# Patient Record
Sex: MALE | Race: WHITE
[De-identification: names, ages, dates, MRNs, and addresses within clinical notes are randomized per-mention and may not be internally consistent; named-entity substitution may affect disease eponyms.]

---

## 2017-01-04 ENCOUNTER — HOSPITAL ENCOUNTER (OUTPATIENT)
Dept: HOSPITAL 45 - C.CTS | Age: 62
Discharge: HOME | End: 2017-01-04
Attending: UROLOGY
Payer: COMMERCIAL

## 2017-01-04 DIAGNOSIS — C64.9: Primary | ICD-10-CM

## 2017-01-04 NOTE — DIAGNOSTIC IMAGING REPORT
CT OF THE ABDOMEN AND PELVIS WITH AND WITHOUT CONTRAST RENAL PROTOCOL



CLINICAL HISTORY: Renal cell carcinoma.    



COMPARISON STUDY:  CT of the abdomen and pelvis May 23, 2016.



TECHNIQUE: Unenhanced, nephrographic and delayed phase imaging was performed.

Injection of 116 cc of Optiray 320 IV was uneventful.



FINDINGS: The chest will be reported separately. The liver, spleen, adrenal

glands and pancreas are unremarkable. Postsurgical findings within the midpole

of the right kidney suggestive of a partial nephrectomy are noted. Nonenhancing

material within the operative bed has slightly diminished since prior exam. No

enhancement is identified to suggest residual or recurrent tumor at this site.

There are numerous bilateral renal lesions. These likely reflect cysts. A few

lesions are too small to characterize. A 1.6 cm lesion within the midpole of the

left kidney demonstrates no significant enhancement and is therefore benign.

There is no hydronephrosis. No abdominal or pelvic lymphadenopathy is present.

There are no suspicious skeletal lesions. Caliber and wall thickness of small

and large bowel are normal.



IMPRESSION:  



1. No evidence of residual or recurrent malignancy status post partial right

nephrectomy.



2. Multiple bilateral renal lesions. These likely reflect cysts although a few

subcentimeter lesions are too small to characterize. 







Electronically signed by:  Danny Thrasher M.D.

1/4/2017 3:38 PM

## 2017-01-04 NOTE — DIAGNOSTIC IMAGING REPORT
CT OF THE CHEST WITH IV CONTRAST



CLINICAL HISTORY: Renal cell carcinoma    



COMPARISON STUDY:  5/23/2016 



TECHNIQUE:  Following the IV administration of 116 mL of Optiray-320, CT of the

thorax was performed from the thoracic inlet to the lung bases. Images are

reviewed in the axial, sagittal, and coronal planes. IV contrast was

administered without complication.



CT DOSE: 1368.68 mGy.cm



FINDINGS:



Thyroid: Imaged portions of the thyroid gland are normal in appearance.



Thoracic aorta: The thoracic aorta is normal in course and caliber, noting

standard 3-vessel arch anatomy. No aneurysm or dissection is seen.



Pulmonary vasculature: The pulmonary trunk is normal in caliber. There are no

central filling defects identified to suggest pulmonary embolus. Note that this

examination was not protocoled for the evaluation of pulmonary emboli.



HEART: The heart is normal in size and configuration, without pericardial

effusion.



Lungs and pleural spaces: There are postsurgical changes within the right lower

lobe. There are mild dependent atelectatic changes present. There is a 3 mm

right middle lobe pulmonary nodule as visualized in image #149/326. This remain

stable. There is a 3 mm right upper lobe pulmonary nodule as visualized on image

#1 6/326. This remain stable. There is a 3 mm pulmonary nodule within the

lingula as visualized in image #184/326. This remain stable. There is a new

pancake-shaped 10 mm nodule within the lingula as visualized in image #217/326.

Close follow-up is recommended to differentiate a neoplastic nodule from focal

atelectasis.



Mediastinum: There is no mediastinal lymphadenopathy.



Devora: There is no evidence of pathologic hilar lymphadenopathy



Axilla: Clear.



Upper abdomen:  There is a complex partially cystic 21 mm right renal opacity.

This likely relates to the patient's previously resected neoplasm. This will be

discussed on the dedicated CT scan of the abdomen and pelvis



Skeletal structures: There are no lytic or blastic osseous lesions.



IMPRESSION:  

1. No evidence of pathologic adenopathy

2. Postsurgical changes in the right lower lobe

3. Multiple bilateral subcentimeter pulmonary nodules. These remain stable with

the exception of a new pancake shaped 10 mm nodule within the base of the

lingula. Close follow-up is recommended to differentiate a neoplastic nodule

from focal atelectasis







Electronically signed by:  Adolfo Gonzales M.D.

1/4/2017 10:59 AM

## 2017-04-04 ENCOUNTER — HOSPITAL ENCOUNTER (OUTPATIENT)
Dept: HOSPITAL 45 - C.CTS | Age: 62
Discharge: HOME | End: 2017-04-04
Attending: NURSE PRACTITIONER
Payer: COMMERCIAL

## 2017-04-04 DIAGNOSIS — C64.1: Primary | ICD-10-CM

## 2017-04-04 NOTE — DIAGNOSTIC IMAGING REPORT
CHEST CT WITH CONTRAST



CT DOSE: 305.31 mGy.cm



HISTORY:      Renal cell cancer. Follow-up pulmonary nodules.



TECHNIQUE: Multiaxial CT images of the chest were performed following the

intravenous administration of contrast.



COMPARISON:  Chest CT 1/4/2017.



FINDINGS:  The central airways are patent. No pleural effusions. No

pneumothorax. Evidence for prior wedge resection within the right lower lobe. A

few small linear densities adjacent to the right resection favor scarring. This

is not significantly changed. Mild emphysema. Stable 3 mm nodule within the

lingula on image 189. Stable 3 mm left lower lobe nodule on image 227 and stable

3 mm left lower lobe nodule on image 194. Stable 3 mm nodule within the right

upper lobe on image 110 and within the right middle lobe on image 152. Stable 3

mm subpleural nodule within the right middle lobe on image 184. A 10 mm nodule

within the lingula on the prior study has almost completely resolved and is

consistent with resolving atelectasis. No new pulmonary nodules. No suspicious

lytic or blastic osseous lesions. Old left postthoracotomy changes. No

mediastinal or hilar lymphadenopathy. No hepatic or splenic masses. Normal

adrenal glands. Partially visualized right renal abnormality which appears to be

stable. This may be due to postoperative change. This is incompletely evaluated

on this study.



IMPRESSION: 



1. The 10 mm lingular nodule seen on the prior study has essentially resolved.

Therefore, this favors resolving atelectasis.

2. A few scattered subcentimeter indeterminate pulmonary nodules remain

unchanged. Continued follow is recommended.

3. No new pulmonary nodules identified.

4. Postoperative as described above. 







Electronically signed by:  Zhou Velásquez M.D.

4/4/2017 8:41 AM



Dictated Date/Time:  4/4/2017 8:32 AM

## 2017-09-20 ENCOUNTER — HOSPITAL ENCOUNTER (OUTPATIENT)
Dept: HOSPITAL 45 - C.CTS | Age: 62
Discharge: HOME | End: 2017-09-20
Attending: INTERNAL MEDICINE
Payer: COMMERCIAL

## 2017-09-20 DIAGNOSIS — C64.1: Primary | ICD-10-CM

## 2017-09-20 LAB
ALBUMIN/GLOB SERPL: 1.2 {RATIO} (ref 0.9–2)
ALP SERPL-CCNC: 49 U/L (ref 45–117)
ALT SERPL-CCNC: 28 U/L (ref 12–78)
ANION GAP SERPL CALC-SCNC: 5 MMOL/L (ref 3–11)
AST SERPL-CCNC: 16 U/L (ref 15–37)
BUN SERPL-MCNC: 21 MG/DL (ref 7–18)
BUN/CREAT SERPL: 17.5 (ref 10–20)
CALCIUM SERPL-MCNC: 9.6 MG/DL (ref 8.5–10.1)
CHLORIDE SERPL-SCNC: 104 MMOL/L (ref 98–107)
CHOLEST/HDLC SERPL: 3.6 {RATIO}
CO2 SERPL-SCNC: 31 MMOL/L (ref 21–32)
CREAT SERPL-MCNC: 1.2 MG/DL (ref 0.6–1.4)
GLOBULIN SER-MCNC: 3.4 GM/DL (ref 2.5–4)
GLUCOSE SERPL-MCNC: 100 MG/DL (ref 70–99)
GLUCOSE UR QL: 49 MG/DL
KETONES UR QL STRIP: 104 MG/DL
NITRITE UR QL STRIP: 104 MG/DL (ref 0–150)
PH UR: 174 MG/DL (ref 0–200)
POTASSIUM SERPL-SCNC: 3.6 MMOL/L (ref 3.5–5.1)
SODIUM SERPL-SCNC: 140 MMOL/L (ref 136–145)
VERY LOW DENSITY LIPOPROT CALC: 21 MG/DL

## 2017-09-20 NOTE — DIAGNOSTIC IMAGING REPORT
(CHEST) THORAX WITH



CLINICAL HISTORY: 62 years-old Male presenting with RENAL CELL CANCER, partial

right nephrectomy. 



TECHNIQUE: Multidetector CT imaging of the chest was performed after the

administration of intravenous contrast. IV contrast: 95 mL of Optiray 320. A

dose lowering technique was used consistent with the principles of ALARA (as low

as reasonably achievable).



COMPARISON: 4/4/2017.



CT DOSE (mGy.cm): The estimated cumulative dose is 1185.72 mGycm.



FINDINGS:



 topogram: Unremarkable.



On soft tissue windows, normal thyroid and thoracic inlet. Few small hilar lymph

nodes, likely reactive. No pathologically enlarged lymph nodes. Mild

atherosclerosis of aortic arch. Normal heart size. No pericardial or pleural

effusion. 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 



On lung windows, postsurgical changes of wedge resection in the superior segment

of the right lower lobe, unchanged. Minimal bandlike opacities at the lung bases

likely atelectasis or scarring. No new pulmonary nodule. Airways patent.

Multiple previously noted nodules index below:

-Stable solid 3 mm lingular nodule (series 4 image 181).  

-Stable solid 3 mm left lower lobe nodule (series 4 image 209).

-Stable solid 3 mm left lower lobe nodule (series 4 image 186). 

-Stable solid 3 mm right upper lobe nodule (series 4 image 104).

-Stable solid triangular 3 mm right middle lobe nodule (series 4 image 147).  

-Stable solid 3 mm subpleural right middle lobe nodule (series 4 image 181). 



On bone windows, degenerative changes of the spine.



IMPRESSION:

1.  Stable bilateral solid pulmonary nodules measuring up to 3 mm. These have

been stable since 5/23/2016. No further follow-up is warranted per [employer]

[employer]ociety 2017 recommendations below.



2.  No new pulmonary nodule. No acute intrathoracic pathology.



Please refer to below summary of Fleischner Society 2017 recommendations for

follow-up of incidental CT nodules (H Minor et al. Guidelines for management

of incidental pulmonary nodules detected on CT images: From the Fleischner

Society 2017. Radiology 2017; 284: 228-243.)



SOLID NODULES



Single nodule; size < 6 mm

*  Low risk patients: No routine follow-up

*  High risk patients: Optional CT at 12 months



Single nodule; size 6-8 mm

*  Low risk patients: CT at 6-12 months, then consider CT at 18-24 months

*  High risk patients: CT at 6-12 months, then at 18-24 months



Single nodule; size > 8 mm

*  Either low or high risk patients: Considered CT at 3 months, PET/CT, or

tissue sampling



Multiple nodules; size < 6 mm

*  Low risk patients: No routine follow up

*  High risk patients: Optional CT at 12 months



Multiple nodules; size 6-8 mm

*  Low risk patients: CT at 3-6 months, then consider CT at 18-24 months

*  High risk patients: CT at 3-6 months, then at 18-24 months



Multiple nodules; size > 8 mm

*  Low risk patients: CT at 3-6 months, then consider at 18-24 months

*  High risk patients: CT at 3-6 months, then at 18-24 months





Note: These guidelines apply to incidental nodules. These guidelines do not

apply to patients younger than 35 years, immunocompromised patients, or patients

with cancer.

*  Low risk patients: Minimal or absent history of smoking and/or other known

risk factors

*  High risk patients: History of smoking, exposure to other carcinogens,

emphysema, fibrosis, upper lobe location, family history of lung cancer, etc. 

*  If a nodule up to 8 mm is partly solid or is ground glass, further follow-up

is required after 24 months to exclude possible slow growing adenocarcinoma.





SUBSOLID NODULES



Single ground-glass nodule

*  Nodule size < 6 mm: No routine follow-up

*  Nodule size > or = 6 mm: CT at 6-12 months to confirm persistence, then CT

every 2 years until 5 years



Single part-solid nodule

*  Nodule size < 6 mm: No routine follow-up

*  Nodules size > or = 6 mm: CT at 3-6 months to confirm persistence. If

unchanged and solid component remains < 6 mm, annual CT should be performed for

5 years



Multiple nodules

*  Nodule size < 6 mm: CT at 3-6 months. If stable, consider CT at 2 and 4

years.

*  Nodules size > or = 6 mm: CT at 3-6 months. Subsequent management based on

the most suspicious nodule(s)







       







Electronically signed by:  Yeyo Medina M.D.

9/20/2017 12:11 PM



Dictated Date/Time:  9/20/2017 12:01 PM

## 2017-09-20 NOTE — DIAGNOSTIC IMAGING REPORT
ABD/PELVIS IV AND ORAL CONT



CT DOSE:    



HISTORY: Renal cell carcinoma  RENAL CELL CANCER



TECHNIQUE: Multiaxial CT images of the abdomen and pelvis were performed

following the use of intravenous and oral contrast.  A dose lowering technique

was utilized adhering to the principles of ALARA.





COMPARISON STUDY: 1/4/2017



FINDINGS: The lung bases are clear. The liver, spleen, gallbladder, pancreas,

kidneys, and adrenal glands are within normal limits. No bowel wall thickening

or obstruction. The pelvic organs are unremarkable. No suspicious lytic or

blastic osseous lesions. Stable postoperative changes mid pole right kidney

laterally. Stable subcentimeter nodular densities bilaterally statistically

suggestive of small cysts. These again are unchanged.



No significant abdominal pelvic or inguinal adenopathy. Bowel pattern overall is

nonobstructive. Bladder is midline. Inguinal regions are unremarkable.



IMPRESSION: 



1. Stable postprocedural change right kidney.





2. Stable subcentimeter cysts throughout both kidneys.

3. Otherwise negative abdomen and pelvis.

4. No change from the prior exam. 









The above report was generated using voice recognition software.  It may contain

grammatical, syntax or spelling errors.







Electronically signed by:  Josemanuel Stewart M.D.

9/20/2017 12:13 PM



Dictated Date/Time:  9/20/2017 12:08 PM

## 2017-10-11 ENCOUNTER — HOSPITAL ENCOUNTER (OUTPATIENT)
Dept: HOSPITAL 45 - C.LABMFLN | Age: 62
Discharge: HOME | End: 2017-10-11
Attending: INTERNAL MEDICINE
Payer: COMMERCIAL

## 2017-10-11 DIAGNOSIS — C64.1: Primary | ICD-10-CM

## 2017-10-11 LAB
ALBUMIN/GLOB SERPL: 1.1 {RATIO} (ref 0.9–2)
ALP SERPL-CCNC: 49 U/L (ref 45–117)
ALT SERPL-CCNC: 30 U/L (ref 12–78)
ANION GAP SERPL CALC-SCNC: 8 MMOL/L (ref 3–11)
AST SERPL-CCNC: 15 U/L (ref 15–37)
BASOPHILS # BLD: 0.02 K/UL (ref 0–0.2)
BASOPHILS NFR BLD: 0.4 %
BUN SERPL-MCNC: 17 MG/DL (ref 7–18)
BUN/CREAT SERPL: 13.4 (ref 10–20)
CALCIUM SERPL-MCNC: 8.8 MG/DL (ref 8.5–10.1)
CHLORIDE SERPL-SCNC: 106 MMOL/L (ref 98–107)
CO2 SERPL-SCNC: 26 MMOL/L (ref 21–32)
COMPLETE: YES
CREAT SERPL-MCNC: 1.3 MG/DL (ref 0.6–1.4)
EOSINOPHIL NFR BLD AUTO: 229 K/UL (ref 130–400)
GLOBULIN SER-MCNC: 3.6 GM/DL (ref 2.5–4)
GLUCOSE SERPL-MCNC: 99 MG/DL (ref 70–99)
HCT VFR BLD CALC: 44.4 % (ref 42–52)
IG%: 0.2 %
IMM GRANULOCYTES NFR BLD AUTO: 31.9 %
LYMPHOCYTES # BLD: 1.72 K/UL (ref 1.2–3.4)
MCH RBC QN AUTO: 29.9 PG (ref 25–34)
MCHC RBC AUTO-ENTMCNC: 33.3 G/DL (ref 32–36)
MCV RBC AUTO: 89.7 FL (ref 80–100)
MONOCYTES NFR BLD: 9.3 %
NEUTROPHILS # BLD AUTO: 2.4 %
NEUTROPHILS NFR BLD AUTO: 55.8 %
PMV BLD AUTO: 10.6 FL (ref 7.4–10.4)
POTASSIUM SERPL-SCNC: 3.5 MMOL/L (ref 3.5–5.1)
PSA SERPL-MCNC: 0.7 NG/ML (ref 0–4)
RBC # BLD AUTO: 4.95 M/UL (ref 4.7–6.1)
SODIUM SERPL-SCNC: 140 MMOL/L (ref 136–145)
WBC # BLD AUTO: 5.4 K/UL (ref 4.8–10.8)

## 2018-01-23 ENCOUNTER — HOSPITAL ENCOUNTER (OUTPATIENT)
Dept: HOSPITAL 45 - C.LABMFLN | Age: 63
Discharge: HOME | End: 2018-01-23
Attending: INTERNAL MEDICINE
Payer: COMMERCIAL

## 2018-01-23 DIAGNOSIS — C64.1: Primary | ICD-10-CM

## 2018-01-23 LAB
ALBUMIN SERPL-MCNC: 3.9 GM/DL (ref 3.4–5)
ALP SERPL-CCNC: 49 U/L (ref 45–117)
ALT SERPL-CCNC: 42 U/L (ref 12–78)
AST SERPL-CCNC: 16 U/L (ref 15–37)
BASOPHILS # BLD: 0.02 K/UL (ref 0–0.2)
BASOPHILS NFR BLD: 0.3 %
BUN SERPL-MCNC: 23 MG/DL (ref 7–18)
CALCIUM SERPL-MCNC: 8.8 MG/DL (ref 8.5–10.1)
CO2 SERPL-SCNC: 28 MMOL/L (ref 21–32)
CREAT SERPL-MCNC: 1.21 MG/DL (ref 0.6–1.4)
EOS ABS #: 0.14 K/UL (ref 0–0.5)
EOSINOPHIL NFR BLD AUTO: 210 K/UL (ref 130–400)
GLUCOSE SERPL-MCNC: 94 MG/DL (ref 70–99)
HCT VFR BLD CALC: 43.8 % (ref 42–52)
HGB BLD-MCNC: 15 G/DL (ref 14–18)
IG#: 0.01 K/UL (ref 0–0.02)
IMM GRANULOCYTES NFR BLD AUTO: 35.9 %
LYMPHOCYTES # BLD: 2.41 K/UL (ref 1.2–3.4)
MCH RBC QN AUTO: 30.4 PG (ref 25–34)
MCHC RBC AUTO-ENTMCNC: 34.2 G/DL (ref 32–36)
MCV RBC AUTO: 88.8 FL (ref 80–100)
MONO ABS #: 0.45 K/UL (ref 0.11–0.59)
MONOCYTES NFR BLD: 6.7 %
NEUT ABS #: 3.69 K/UL (ref 1.4–6.5)
NEUTROPHILS # BLD AUTO: 2.1 %
NEUTROPHILS NFR BLD AUTO: 54.9 %
PMV BLD AUTO: 10.7 FL (ref 7.4–10.4)
POTASSIUM SERPL-SCNC: 3.8 MMOL/L (ref 3.5–5.1)
PROT SERPL-MCNC: 7.3 GM/DL (ref 6.4–8.2)
RED CELL DISTRIBUTION WIDTH CV: 13.5 % (ref 11.5–14.5)
RED CELL DISTRIBUTION WIDTH SD: 43.6 FL (ref 36.4–46.3)
SODIUM SERPL-SCNC: 139 MMOL/L (ref 136–145)
WBC # BLD AUTO: 6.72 K/UL (ref 4.8–10.8)

## 2018-04-18 ENCOUNTER — HOSPITAL ENCOUNTER (OUTPATIENT)
Dept: HOSPITAL 45 - C.RAD | Age: 63
Discharge: HOME | End: 2018-04-18
Attending: NURSE PRACTITIONER
Payer: COMMERCIAL

## 2018-04-18 DIAGNOSIS — C64.1: Primary | ICD-10-CM

## 2018-04-18 LAB
ALBUMIN SERPL-MCNC: 4 GM/DL (ref 3.4–5)
ALP SERPL-CCNC: 53 U/L (ref 45–117)
ALT SERPL-CCNC: 39 U/L (ref 12–78)
AST SERPL-CCNC: 17 U/L (ref 15–37)
BASOPHILS # BLD: 0.03 K/UL (ref 0–0.2)
BASOPHILS NFR BLD: 0.5 %
BUN SERPL-MCNC: 24 MG/DL (ref 7–18)
CALCIUM SERPL-MCNC: 9.1 MG/DL (ref 8.5–10.1)
CO2 SERPL-SCNC: 27 MMOL/L (ref 21–32)
CREAT SERPL-MCNC: 1.3 MG/DL (ref 0.6–1.4)
EOS ABS #: 0.1 K/UL (ref 0–0.5)
EOSINOPHIL NFR BLD AUTO: 218 K/UL (ref 130–400)
GLUCOSE SERPL-MCNC: 92 MG/DL (ref 70–99)
HCT VFR BLD CALC: 44.8 % (ref 42–52)
HGB BLD-MCNC: 15.3 G/DL (ref 14–18)
IG#: 0.01 K/UL (ref 0–0.02)
IMM GRANULOCYTES NFR BLD AUTO: 28.1 %
LYMPHOCYTES # BLD: 1.7 K/UL (ref 1.2–3.4)
MCH RBC QN AUTO: 30.2 PG (ref 25–34)
MCHC RBC AUTO-ENTMCNC: 34.2 G/DL (ref 32–36)
MCV RBC AUTO: 88.5 FL (ref 80–100)
MONO ABS #: 0.37 K/UL (ref 0.11–0.59)
MONOCYTES NFR BLD: 6.1 %
NEUT ABS #: 3.83 K/UL (ref 1.4–6.5)
NEUTROPHILS # BLD AUTO: 1.7 %
NEUTROPHILS NFR BLD AUTO: 63.4 %
PMV BLD AUTO: 10.5 FL (ref 7.4–10.4)
POTASSIUM SERPL-SCNC: 3.6 MMOL/L (ref 3.5–5.1)
PROT SERPL-MCNC: 7.8 GM/DL (ref 6.4–8.2)
RED CELL DISTRIBUTION WIDTH CV: 13.6 % (ref 11.5–14.5)
RED CELL DISTRIBUTION WIDTH SD: 43.6 FL (ref 36.4–46.3)
SODIUM SERPL-SCNC: 137 MMOL/L (ref 136–145)
WBC # BLD AUTO: 6.04 K/UL (ref 4.8–10.8)

## 2018-04-18 NOTE — DIAGNOSTIC IMAGING REPORT
CHEST 2 VIEWS ROUTINE



HISTORY:  63 years-old Male RENAL CELL CANCER follow-up study in a patient with

renal cell carcinoma



COMPARISON: Chest radiograph 8/24/2016, chest CT 9/20/2017



TECHNIQUE: PA and lateral views of the chest



FINDINGS: 

Cardiac silhouette is within normal limits. Mild atherosclerosis of the aorta.

Surgical suture material, likely from prior wedge resection is again noted

within the right lower lobe with adjacent subsegmental pleural-parenchymal

scarring. Mild right hemidiaphragmatic elevation. No pneumothorax, pleural

effusion, focal airspace consolidation or overt pulmonary edema. Bones of the

chest appear grossly intact. Degenerative changes are seen within the shoulders

and spine.



IMPRESSION: No acute process. 







The above report was generated using voice recognition software. It may contain

grammatical, syntax or spelling errors.







Electronically signed by:  Albert Traylor M.D.

4/18/2018 2:39 PM



Dictated Date/Time:  4/18/2018 2:38 PM